# Patient Record
Sex: MALE | Race: WHITE | URBAN - METROPOLITAN AREA
[De-identification: names, ages, dates, MRNs, and addresses within clinical notes are randomized per-mention and may not be internally consistent; named-entity substitution may affect disease eponyms.]

---

## 2019-07-15 ENCOUNTER — OFFICE VISIT (OUTPATIENT)
Dept: CARDIOLOGY CLINIC | Age: 39
End: 2019-07-15

## 2019-07-15 VITALS
WEIGHT: 201 LBS | DIASTOLIC BLOOD PRESSURE: 80 MMHG | HEART RATE: 88 BPM | SYSTOLIC BLOOD PRESSURE: 140 MMHG | OXYGEN SATURATION: 98 % | BODY MASS INDEX: 28.03 KG/M2

## 2019-07-15 DIAGNOSIS — R07.9 CHEST PAIN, UNSPECIFIED TYPE: ICD-10-CM

## 2019-07-15 DIAGNOSIS — R42 DIZZINESS: ICD-10-CM

## 2019-07-15 DIAGNOSIS — I10 ESSENTIAL HYPERTENSION: ICD-10-CM

## 2019-07-15 DIAGNOSIS — R00.2 PALPITATION: Primary | ICD-10-CM

## 2019-07-15 DIAGNOSIS — R06.02 SOB (SHORTNESS OF BREATH): ICD-10-CM

## 2019-07-15 NOTE — PROGRESS NOTES
Viktor Escobar presents today for No chief complaint on file. Viktor Escobar preferred language for health care discussion is english/other. Is someone accompanying this pt? no    Is the patient using any DME equipment during OV? no    Depression Screening:  No flowsheet data found. Learning Assessment:  No flowsheet data found. Abuse Screening:  No flowsheet data found. Fall Risk  No flowsheet data found. Pt currently taking Anticoagulant therapy? no    Coordination of Care:  1. Have you been to the ER, urgent care clinic since your last visit? Hospitalized since your last visit? Yes [facility]    2. Have you seen or consulted any other health care providers outside of the [address] [place] [address] since your last visit? Include any pap smears or colon screening.  no

## 2019-07-15 NOTE — PROGRESS NOTES
HPI: A 37-year old male self referred for palpitations. About four to five years ago is when his palpitations first started. He has been seen by Dr. Jorge Alberto Fine in the past with an event monitor and echocardiogram as well as treadmill stress test that was unremarkable. He has been seen in the emergency room most recently in April with increasing palpitations. He also has a funny sensation in the throat and pains at times, left arm pain, and substernal chest pressure. He works in auto repair. It does not seem to be correlated to any occupational exposure. He does smoke about a pack a day and has for a number of years. He has occasional alcohol use that is not excessive. He has not had any syncope. His blood pressure runs on the high side as well when he checks it at home. Impression/Plan:  1. Recent palpitations, shortness of breath, chest pain with ongoing workup. 2. Recent emergency room visit to [facility] April 2019 with normal electrolytes, creatinine, TSH, CBC.  3. History of gastroesophageal reflux disease and hiatal hernia on Nexium. 4. Recent diagnosis of diabetes and on Metformin with a strong family history of diabetes. 5. Event monitor, treadmill stress test and echocardiogram 2016, unremarkable. 6. Hypertension by report and today. At this point I would like to obtain an echocardiogram, exercise nuclear stress test and 30-day event monitor. I discussed minimizing Ibuprofen use as well as stimulant and alcohol intake. We also talked about smoking cessation. He will keep a home blood pressure logbook as well and I will see him back after testing. All questions answered.        Past Medical History:   Diagnosis Date    Chest pain     Depression     Dizziness     Hiatal hernia     Hypertension     Kidney stone     Palpitation     SOB (shortness of breath)        Current Outpatient Medications   Medication Sig Dispense Refill    oxyCODONE-acetaminophen (PERCOCET) 5-325 mg per tablet Take 1 Tab by mouth every four (4) hours as needed for Pain.  ondansetron hcl (ZOFRAN, AS HYDROCHLORIDE,) 4 mg tablet Take 4 mg by mouth every eight (8) hours as needed for Nausea.  ibuprofen (MOTRIN IB) 200 mg tablet Take  by mouth.  acetaminophen (TYLENOL) 325 mg tablet Take  by mouth every four (4) hours as needed for Pain.  tamsulosin (FLOMAX) 0.4 mg capsule Take 1 Cap by mouth daily. 30 Cap 3       Social History   reports that he has been smoking cigarettes. He has a 4.00 pack-year smoking history. He has never used smokeless tobacco.   reports that he drinks alcohol. Family History  family history includes Cancer in his mother; Diabetes in his father and mother; Lupus in his mother; Other in his father. Review of Systems  Except as stated above include:  Constitutional: Negative for fever, chills and malaise/fatigue. HEENT: No congestion or recent URI. Gastrointestinal: No nausea, vomiting, abdominal pain, bloody stools. Pulmonary:  Negative except as stated above. Cardiac:  Negative except as stated above. Musculoskeletal: Negative except as stated above. Neurological:  No localized symptoms. Skin:  Negative except as stated above. Psych:  Negative except as stated above. Endocrine:  Negative except as stated above. PHYSICAL EXAM  BP Readings from Last 3 Encounters:   No data found for BP     Pulse Readings from Last 3 Encounters:   No data found for Pulse     Wt Readings from Last 3 Encounters:   11/11/16 93 kg (205 lb)     General:   Well developed, well groomed. Head/Neck:   No jugular venous distention     No carotid bruits. No evidence of xanthelasma. Lungs:   No respiratory distress. Clear bilaterally. Heart:    Regular rate and rhythm. Normal S1/S2. Palpation of heart with normal point of maximum impulse. No significant murmurs, rubs or gallops. Abdomen:   Soft and nontender. No palpable abdominal mass or bruits.   Extremities:   Intact peripheral pulses. No significant edema. Neurological:   Alert and oriented to person, place, time. No focal neurological deficit visually. Skin:   No obvious rash    Blood Pressure Metric:  Monitor recommended and adjustments stated if needed.

## 2019-08-06 ENCOUNTER — HOSPITAL ENCOUNTER (OUTPATIENT)
Dept: NON INVASIVE DIAGNOSTICS | Age: 39
Discharge: HOME | End: 2019-08-06
Attending: INTERNAL MEDICINE